# Patient Record
Sex: FEMALE | Race: WHITE | NOT HISPANIC OR LATINO
[De-identification: names, ages, dates, MRNs, and addresses within clinical notes are randomized per-mention and may not be internally consistent; named-entity substitution may affect disease eponyms.]

---

## 2017-05-20 ENCOUNTER — TRANSCRIPTION ENCOUNTER (OUTPATIENT)
Age: 62
End: 2017-05-20

## 2021-07-29 ENCOUNTER — TRANSCRIPTION ENCOUNTER (OUTPATIENT)
Age: 66
End: 2021-07-29

## 2021-07-30 ENCOUNTER — OUTPATIENT (OUTPATIENT)
Dept: OUTPATIENT SERVICES | Facility: HOSPITAL | Age: 66
LOS: 1 days | Discharge: HOME | End: 2021-07-30

## 2021-07-30 LAB — GLUCOSE BLDC GLUCOMTR-MCNC: 129 MG/DL — HIGH (ref 70–99)

## 2021-10-15 VITALS
HEART RATE: 69 BPM | WEIGHT: 127.21 LBS | RESPIRATION RATE: 16 BRPM | DIASTOLIC BLOOD PRESSURE: 81 MMHG | OXYGEN SATURATION: 96 % | HEIGHT: 66 IN | SYSTOLIC BLOOD PRESSURE: 132 MMHG | TEMPERATURE: 98 F

## 2021-10-15 NOTE — ASU PATIENT PROFILE, ADULT - NS TRANSFER DISPOSITION PATIENT BELONGINGS
Latex:  Patient denies allergy to latex.  Medications reviewed with patient.  Tobacco use verified.  Allergies verified.    CHIEF COMPLAINT  Right shoulder pain     REFERRING PHYSICIAN:  Jamie Clemente MD  PRIMARY CARE PROVIDER - Jamie Clemente MD     Seen by BEV Jauregui 10/14/16.  Seen last by Dr. Reeder in .       HISTORY OF PRESENT ILLNESS        Patient is Right  hand dominant.         Occupation:  [employer]       Current work status:  working regular duty  1.  When did the main problem begin?  Ongoing for > 7 years  2.  Describe the injury and/or pain or other complaints:  Denies injury  3.  Locate the area of the problem/pain:   anterior, posterior and lateral, reports numbness and tingling in R arm after sleeping on it   4.  Is there any other pertinent background information?   None  5.  Has the patient had any treatment yet?   medications including NSAID, acetaminophen for more than six weeks and PT,  Massage, accupuncture  Yoga and boot camp twice weekly, reports has constant R shoulder pain    Pt states she never really felt like she got her full strength back in R shoulder. She has been working with a  for a while. She cannot due certain exercises due to the weakness. Shoulder is not really painful, \"inceased soreness and twingy on that side after exercising\". She feels like her R shoulder blade is more pronounced and her shoulder is more forward than the L. She is taking Tylenol or Aleve prn for pain.    PAST MEDICAL HISTORY  Past Medical History:   Diagnosis Date   • Colon Polyp 2012    hyperplastic polyp   • Family history of malignant neoplasm of gastrointestinal tract     paternal aunt, paternal grandmother   • Special screening for malignant neoplasms, colon 2019    Normal   • Unspecified spontaneous  without mention of complication        PAST SURGICAL HISTORY  Past Surgical History:   Procedure Laterality Date   • Biopsy of breast, incisional   06, 11    Right Breast Biopsy lipoma with fibroglandular breast tissue   •  delivery+postpartum care         41 weeks inactive ohase arrest of 5cm   • Colonoscopy diagnostic  09    Dr. Naik, [place] +FH, due2012 (no abn)   • Colonoscopy diagnostic  2012    Colonoscopy, Dx   • Colonoscopy diagnostic  2019    Dr. Qiu WNL recall, 2024   • Each add tooth extraction       wisdom teeth removed   • Laparoscopy procedure unlisted      laparoscopy with a R oophorectomy for a R endometrioma   • Reduction of large breast      bilateral breast reduction       REVIEW OF SYSTEMS    Gastrointestinal:  none  Genitourinary:  none  Nervous:  none  Cardiac:  none  Respiratory:  none  Integumentary:  none  Allergies:  See allergy list  Hematologic:  none         given to Security

## 2021-10-17 ENCOUNTER — TRANSCRIPTION ENCOUNTER (OUTPATIENT)
Age: 66
End: 2021-10-17

## 2021-10-18 ENCOUNTER — TRANSCRIPTION ENCOUNTER (OUTPATIENT)
Age: 66
End: 2021-10-18

## 2021-10-18 ENCOUNTER — OUTPATIENT (OUTPATIENT)
Dept: OUTPATIENT SERVICES | Facility: HOSPITAL | Age: 66
LOS: 1 days | Discharge: HOME | End: 2021-10-18
Payer: MEDICARE

## 2021-10-18 VITALS
RESPIRATION RATE: 15 BRPM | DIASTOLIC BLOOD PRESSURE: 76 MMHG | HEART RATE: 75 BPM | OXYGEN SATURATION: 97 % | SYSTOLIC BLOOD PRESSURE: 141 MMHG

## 2021-10-18 LAB
GLUCOSE BLDC GLUCOMTR-MCNC: 122 MG/DL — HIGH (ref 70–99)
GLUCOSE BLDC GLUCOMTR-MCNC: 124 MG/DL — HIGH (ref 70–99)

## 2021-10-18 PROCEDURE — C1755: CPT

## 2021-10-18 PROCEDURE — 82962 GLUCOSE BLOOD TEST: CPT

## 2021-10-18 PROCEDURE — 62350 IMPLANT SPINAL CANAL CATH: CPT

## 2021-10-18 PROCEDURE — 76000 FLUOROSCOPY <1 HR PHYS/QHP: CPT

## 2021-10-18 RX ORDER — ATORVASTATIN CALCIUM 80 MG/1
1 TABLET, FILM COATED ORAL
Qty: 0 | Refills: 0 | DISCHARGE

## 2021-10-18 RX ORDER — OXYCODONE HYDROCHLORIDE 5 MG/1
1 TABLET ORAL
Qty: 0 | Refills: 0 | DISCHARGE

## 2021-10-18 RX ORDER — GABAPENTIN 400 MG/1
1 CAPSULE ORAL
Qty: 0 | Refills: 0 | DISCHARGE

## 2021-10-18 RX ORDER — METFORMIN HYDROCHLORIDE 850 MG/1
1 TABLET ORAL
Qty: 0 | Refills: 0 | DISCHARGE

## 2021-10-18 RX ORDER — OXYCODONE AND ACETAMINOPHEN 5; 325 MG/1; MG/1
2 TABLET ORAL ONCE
Refills: 0 | Status: DISCONTINUED | OUTPATIENT
Start: 2021-10-18 | End: 2021-10-18

## 2021-10-18 RX ORDER — IRBESARTAN 75 MG/1
1 TABLET ORAL
Qty: 0 | Refills: 0 | DISCHARGE

## 2021-10-18 RX ORDER — FERROUS SULFATE 325(65) MG
0 TABLET ORAL
Qty: 0 | Refills: 0 | DISCHARGE

## 2021-10-18 RX ORDER — GABAPENTIN 400 MG/1
800 CAPSULE ORAL THREE TIMES A DAY
Refills: 0 | Status: DISCONTINUED | OUTPATIENT
Start: 2021-10-18 | End: 2021-10-18

## 2021-10-18 RX ORDER — HYOSCYAMINE SULFATE 0.13 MG
0 TABLET ORAL
Qty: 0 | Refills: 0 | DISCHARGE

## 2021-10-18 RX ORDER — UBIDECARENONE 100 MG
100 CAPSULE ORAL
Qty: 0 | Refills: 0 | DISCHARGE

## 2021-10-18 RX ORDER — LAMOTRIGINE 25 MG/1
0 TABLET, ORALLY DISINTEGRATING ORAL
Qty: 0 | Refills: 0 | DISCHARGE

## 2021-10-18 RX ORDER — ZOLPIDEM TARTRATE 10 MG/1
1 TABLET ORAL
Qty: 0 | Refills: 0 | DISCHARGE

## 2021-10-18 RX ORDER — CHOLECALCIFEROL (VITAMIN D3) 125 MCG
1 CAPSULE ORAL
Qty: 0 | Refills: 0 | DISCHARGE

## 2021-10-18 RX ORDER — ESCITALOPRAM OXALATE 10 MG/1
1 TABLET, FILM COATED ORAL
Qty: 0 | Refills: 0 | DISCHARGE

## 2021-10-18 RX ADMIN — OXYCODONE AND ACETAMINOPHEN 2 TABLET(S): 5; 325 TABLET ORAL at 18:51

## 2021-10-18 NOTE — PACU DISCHARGE NOTE - COMMENTS
Patient met PACU criteria. Vital signs stable. Patient ambulated to bathroom and voided. Patient's pain controlled with percocet 2 tabs X 1 time. Epidural catheter site clean dry and intact. Patient expressed a good understanding of care of epidural box. Sling given to patient to contain the epidural medicine box. Heplock discontinued prior to discharge. Patient escorted to [facility] accompanied by nursing assistant. Patient's  escorting patient to home. Patient to see Dr. Nolan tomorrow, patient aware of same.

## 2021-10-18 NOTE — DISCHARGE NOTE PROVIDER - NSDCCPTREATMENT_GEN_ALL_CORE_FT
PRINCIPAL PROCEDURE  Procedure: Insertion, tunneled epidural catheter  Findings and Treatment: 40136

## 2021-10-18 NOTE — DISCHARGE NOTE PROVIDER - NSDCMRMEDTOKEN_GEN_ALL_CORE_FT
CoQ10: 100 milligram(s) orally once a day  gabapentin 800 mg oral tablet: 1 tab(s) orally 3 times a day  hyoscyamine 0.375 mg oral capsule, extended release: orally 2 times a day, As Needed  irbesartan 150 mg oral tablet: 1 tab(s) orally once a day  iron sulfate: orally every other day (at bedtime)  LaMICtal 100 mg oral tablet: orally once a day  Lexapro 20 mg oral tablet: 1 tab(s) orally once a day  Lipitor 10 mg oral tablet: 1 tab(s) orally once a day  metFORMIN 1000 mg oral tablet: 1 tab(s) orally 2 times a day  OxyCONTIN 20 mg oral tablet, extended release: 1 tab(s) orally every 12 hours  Percocet 10/325 oral tablet: 1 tab(s) orally every 6 hours  Vitamin D3 25 mcg (1000 intl units) oral tablet: 1 tab(s) orally once a day  zolpidem 12.5 mg oral tablet, extended release: 1 tab(s) orally once a day (at bedtime)

## 2021-10-18 NOTE — DISCHARGE NOTE PROVIDER - NSDCCPCAREPLAN_GEN_ALL_CORE_FT
PRINCIPAL DISCHARGE DIAGNOSIS  Diagnosis: Chronic pain disorder  Assessment and Plan of Treatment:

## 2021-10-18 NOTE — ASU DISCHARGE PLAN (ADULT/PEDIATRIC) - CALL YOUR DOCTOR IF YOU HAVE ANY OF THE FOLLOWING:
Bleeding that does not stop/Fever greater than (need to indicate Fahrenheit or Celsius)/Numbness, tingling, color or temperature change to extremity/Nausea and vomiting that does not stop/Unable to urinate

## 2021-11-11 ENCOUNTER — TRANSCRIPTION ENCOUNTER (OUTPATIENT)
Age: 66
End: 2021-11-11

## 2021-11-12 ENCOUNTER — OUTPATIENT (OUTPATIENT)
Dept: OUTPATIENT SERVICES | Facility: HOSPITAL | Age: 66
LOS: 1 days | Discharge: HOME | End: 2021-11-12

## 2021-11-12 LAB — GLUCOSE BLDC GLUCOMTR-MCNC: 129 MG/DL — HIGH (ref 70–99)

## 2023-03-07 PROBLEM — Z00.00 ENCOUNTER FOR PREVENTIVE HEALTH EXAMINATION: Status: ACTIVE | Noted: 2023-03-07

## 2023-03-15 ENCOUNTER — APPOINTMENT (OUTPATIENT)
Dept: ORTHOPEDIC SURGERY | Facility: CLINIC | Age: 68
End: 2023-03-15
Payer: MEDICARE

## 2023-03-15 VITALS — HEIGHT: 66 IN | BODY MASS INDEX: 20.25 KG/M2 | WEIGHT: 126 LBS

## 2023-03-15 DIAGNOSIS — G89.29 DORSALGIA, UNSPECIFIED: ICD-10-CM

## 2023-03-15 DIAGNOSIS — M41.80 OTHER FORMS OF SCOLIOSIS, SITE UNSPECIFIED: ICD-10-CM

## 2023-03-15 DIAGNOSIS — F17.200 NICOTINE DEPENDENCE, UNSPECIFIED, UNCOMPLICATED: ICD-10-CM

## 2023-03-15 DIAGNOSIS — M54.16 RADICULOPATHY, LUMBAR REGION: ICD-10-CM

## 2023-03-15 DIAGNOSIS — M54.9 DORSALGIA, UNSPECIFIED: ICD-10-CM

## 2023-03-15 PROCEDURE — 72110 X-RAY EXAM L-2 SPINE 4/>VWS: CPT

## 2023-03-15 PROCEDURE — 72070 X-RAY EXAM THORAC SPINE 2VWS: CPT

## 2023-03-15 PROCEDURE — 99204 OFFICE O/P NEW MOD 45 MIN: CPT

## 2023-03-18 PROBLEM — M54.9 CHRONIC BACK PAIN: Status: ACTIVE | Noted: 2023-03-18

## 2023-03-18 PROBLEM — M54.16 LUMBAR RADICULOPATHY: Status: ACTIVE | Noted: 2023-03-18

## 2023-03-18 PROBLEM — M41.80 DEGENERATIVE SCOLIOSIS IN ADULT PATIENT: Status: ACTIVE | Noted: 2023-03-18

## 2023-03-20 NOTE — ASSESSMENT
[FreeTextEntry1] : 67 year old female presents with acute exacerbation of chronic back pain. She has scoliosis. She has recent falls which has contributed to the pain.  She has pain radiating to her left leg.  She had a L3-L4 TLIF for back and leg pain by Dr Dong Johnson [facility] 2020.  Her pain did not improve.  She had a intrathecal pain pump and SCS placed. She takes oxycodone 15 mg q6h.  She is diabetic and her last Hgb A1C was 6.4.  She has an endocrinologist and has been treated for osteoporosis.  She uses tobacco and smokes 6 cigarettes a day.  She is otherwise at her neurologic baseline.We reviewed her imaging including recent CT scans.  She would require revision spinal fusion.  She must quit tobacco first for over 3 months.  She should also discuss with her endocrinologists further treatments for osteoporosis.  She can continue oxycodone and gabapentin.  She can continue PT. She can consider spinal injections. She will followup in 3 months. We discussed red flag symptoms that would require emergent evaluation. She knows to call with any questions or concerns or if her symptoms acutely worsen.

## 2023-03-20 NOTE — PHYSICAL EXAM
[de-identified] : General: No acute distress, conversant, well-nourished.\par Head: Normocephalic, atraumatic\par Neck: trachea midline, FROM\par Heart: normotensive and normal rate and rhythm\par Lungs: No labored breathing\par Skin: No abrasions, no rashes, no edema\par Psych: Alert and oriented to person, place and time\par Extremities: no peripheral edema or digital cyanosis\par Gait: Normal gait. Needs gait aid.\par Vascular: warm and well perfused distally, palpable distal pulses\par \par MSK:\par Spine: \par No tenderness to palpation.  No step-off, no deformity.\par Visible spinal deformity\par \par NEURO:\par Sensation \par          Left           \par C5     2/2               \par C6     2/2               \par C7     2/2               \par C8     2/2              \par T1     2/2             \par \par          Right         \par C5     2/2               \par C6     2/2               \par C7     2/2               \par C8     2/2              \par T1     2/2      \par \par Motor: \par                                                Left             \par C5 (deltoid abduction)             5/5               \par C6 (biceps flexion)                   5/5                \par C7 (triceps extension)             5/5               \par C8 (finger flexion)                     5/5               \par T1 (interosseous)                     5/5           \par \par                                                Right           \par C5 (deltoid abduction)             5/5               \par C6 (biceps flexion)                   5/5                \par C7 (triceps extension)             5/5               \par C8 (finger flexion)                     5/5               \par T1 (interosseous)                     5/5                     \par \par Sensation \par Left L2  -  2/2            \par Left L3  -  2/2\par Left L4  -  2/2\par Left L5  -  2/2\par Left S1  -  2/2\par \par Right L2  -  2/2            \par Right L3  -  2/2\par Right L4  -  2/2\par Right L5  -  2/2\par Right S1  -  2/2\par \par Motor: \par Left L2 (hip flexion)                            5/5                \par Left L3 (knee extension)                   5/5                \par Left L4 (ankle dorsiflexion)                 5/5                \par Left L5 (long toe extensor)                5/5                \par Left S1 (ankle plantar flexion)           5/5\par \par Right L2 (hip flexion)                            5/5                \par Right L3 (knee extension)                   5/5                \par Right L4 (ankle dorsiflexion)                 5/5                \par Right L5 (long toe extensor)                5/5                \par Right S1 (ankle plantar flexion)           5/5\par \par Reflexes: Normal and symmetric\par Negative Spurling’s test.  Negative Bowman’s reflex.  \par Negative clonus.  Down-going Babinski. [de-identified] : I ordered radiographs to evaluate the patient's symptoms.\par \par Thoracic 2 view and lumbar 4 view radiographs obtained in the office today shows no fracture or dislocation.  Severe thoracolumbar scoliosis. s/p L3-L4 TLIF with hardware in appropriate position and alignment.  No instability on dynamic series. \par \par CT Lumbar (2/17/23) Mild lumbar levoscoliosis is redemonstrated. There is no spondylolisthesis or loss of vertebral body heights. Instrumented anterior and posterior fusion at the L3 and L4 levels is redemonstrated. No hardware fracture or loosening is detected. No new solid osseous fusion is detected.\par \par Contrast material is concentrated within the thecal sac at the lower lumbar spine and sacrum, presumably related to intrathecal catheter injection.\par \par Again demonstrated are neurostimulator leads which enter the dorsal aspect of the central canal at the T12-L1 level. Neurostimulator leads extend cranially within the dorsal aspect of the central canal. This is stable in appearance when compared to the prior examination. No lead discontinuity is detected.\par \par Again demonstrated is a spinal catheter which enters the dorsal aspect of the central canal at the L4-L5 level, extending superiorly within the left thecal sac. This is essentially stable in appearance when compared to the prior examination. No catheter discontinuity is detected.\par \par L5-S1: A disc bulge and bilateral facet arthropathy are redemonstrated. Mild bilateral foraminal narrowing is stable. No central canal stenosis is observed.\par \par L4-L5: A disc bulge and bilateral facet arthropathy are redemonstrated. Right greater than left foraminal narrowing is stable. Mild central canal stenosis is stable.\par \par L3-L4: Evaluation of the central canal and neural foramina is limited due to streak artifact. There again appears to be a broad-based right posterolateral disc herniation present. No significant central canal narrowing or foraminal narrowing is detected.\par \par L2-L3: A disc bulge and posterior element hypertrophy are redemonstrated. Mild central canal stenosis is stable. No foraminal narrowing is observed. \par \par L1-L2: A mild disc bulge is present with no significant foraminal narrowing or central canal stenosis, stable. The visualized portions of the sacrum and sacroiliac joints are unremarkable. There appears to be marked distention of the urinary bladder, nonspecific.\par \par CT Thoracic (2/17/23): Thoracic dextroscoliosis is redemonstrated. There is no spondylolisthesis or loss of vertebral body heights. Discogenic degenerative endplate changes centered at T2-T3 are redemonstrated.\par \par Again demonstrated are neurostimulator leads which enter the dorsal aspect of the central canal at the T12-L1 level. Neurostimulator leads extend cranially within the dorsal aspect of the central canal to the superior T7 level. This is stable in appearance when compared to the prior examination. No lead discontinuity is detected.\par \par Again demonstrated is a spinal catheter extending superiorly within the left thecal sac to the mid T10 level. This is essentially stable in appearance when compared to the prior examination. No catheter discontinuity is detected.\par \par Streak artifact related to a neurostimulator leads limits regional evaluation. Otherwise, no significant thoracic disc herniation, central canal stenosis, or foraminal narrowing is detected.\par \par Multiple hypodense nodules involving the left thyroid lobe are redemonstrated. No mass effect upon the tracheal air column is observed.

## 2023-03-20 NOTE — HISTORY OF PRESENT ILLNESS
[de-identified] : 67 year old female presents with acute exacerbation of chronic back pain. She has scoliosis. She has recent falls which has contributed to the pain.  She has pain radiating to her left leg.  She had a L3-L4 TLIF for back and leg pain by Dr Dong Johnson [facility] 2020.  Her pain did not improve.  She had a intrathecal pain pump and SCS placed. She takes oxycodone 15 mg q6h.  She is diabetic and her last Hgb A1C was 6.4.  She has an endocrinologist and has been treated for osteoporosis.  She uses tobacco and smokes 6 cigarettes a day.  She denies recent illness, fevers, numbness, saddle anesthesia, urinary retention or fecal incontinence. She has been doing PT with some relief.  She also takes Gabapentin and Belbuca.

## 2023-03-20 NOTE — HISTORY OF PRESENT ILLNESS
[de-identified] : 67 year old female presents with acute exacerbation of chronic back pain. She has scoliosis. She has recent falls which has contributed to the pain.  She has pain radiating to her left leg.  She had a L3-L4 TLIF for back and leg pain by Dr Dong Johnson [facility] 2020.  Her pain did not improve.  She had a intrathecal pain pump and SCS placed. She takes oxycodone 15 mg q6h.  She is diabetic and her last Hgb A1C was 6.4.  She has an endocrinologist and has been treated for osteoporosis.  She uses tobacco and smokes 6 cigarettes a day.  She denies recent illness, fevers, numbness, saddle anesthesia, urinary retention or fecal incontinence. She has been doing PT with some relief.  She also takes Gabapentin and Belbuca.

## 2023-03-20 NOTE — PHYSICAL EXAM
[de-identified] : General: No acute distress, conversant, well-nourished.\par Head: Normocephalic, atraumatic\par Neck: trachea midline, FROM\par Heart: normotensive and normal rate and rhythm\par Lungs: No labored breathing\par Skin: No abrasions, no rashes, no edema\par Psych: Alert and oriented to person, place and time\par Extremities: no peripheral edema or digital cyanosis\par Gait: Normal gait. Needs gait aid.\par Vascular: warm and well perfused distally, palpable distal pulses\par \par MSK:\par Spine: \par No tenderness to palpation.  No step-off, no deformity.\par Visible spinal deformity\par \par NEURO:\par Sensation \par          Left           \par C5     2/2               \par C6     2/2               \par C7     2/2               \par C8     2/2              \par T1     2/2             \par \par          Right         \par C5     2/2               \par C6     2/2               \par C7     2/2               \par C8     2/2              \par T1     2/2      \par \par Motor: \par                                                Left             \par C5 (deltoid abduction)             5/5               \par C6 (biceps flexion)                   5/5                \par C7 (triceps extension)             5/5               \par C8 (finger flexion)                     5/5               \par T1 (interosseous)                     5/5           \par \par                                                Right           \par C5 (deltoid abduction)             5/5               \par C6 (biceps flexion)                   5/5                \par C7 (triceps extension)             5/5               \par C8 (finger flexion)                     5/5               \par T1 (interosseous)                     5/5                     \par \par Sensation \par Left L2  -  2/2            \par Left L3  -  2/2\par Left L4  -  2/2\par Left L5  -  2/2\par Left S1  -  2/2\par \par Right L2  -  2/2            \par Right L3  -  2/2\par Right L4  -  2/2\par Right L5  -  2/2\par Right S1  -  2/2\par \par Motor: \par Left L2 (hip flexion)                            5/5                \par Left L3 (knee extension)                   5/5                \par Left L4 (ankle dorsiflexion)                 5/5                \par Left L5 (long toe extensor)                5/5                \par Left S1 (ankle plantar flexion)           5/5\par \par Right L2 (hip flexion)                            5/5                \par Right L3 (knee extension)                   5/5                \par Right L4 (ankle dorsiflexion)                 5/5                \par Right L5 (long toe extensor)                5/5                \par Right S1 (ankle plantar flexion)           5/5\par \par Reflexes: Normal and symmetric\par Negative Spurling’s test.  Negative Bowman’s reflex.  \par Negative clonus.  Down-going Babinski. [de-identified] : I ordered radiographs to evaluate the patient's symptoms.\par \par Thoracic 2 view and lumbar 4 view radiographs obtained in the office today shows no fracture or dislocation.  Severe thoracolumbar scoliosis. s/p L3-L4 TLIF with hardware in appropriate position and alignment.  No instability on dynamic series. \par \par CT Lumbar (2/17/23) Mild lumbar levoscoliosis is redemonstrated. There is no spondylolisthesis or loss of vertebral body heights. Instrumented anterior and posterior fusion at the L3 and L4 levels is redemonstrated. No hardware fracture or loosening is detected. No new solid osseous fusion is detected.\par \par Contrast material is concentrated within the thecal sac at the lower lumbar spine and sacrum, presumably related to intrathecal catheter injection.\par \par Again demonstrated are neurostimulator leads which enter the dorsal aspect of the central canal at the T12-L1 level. Neurostimulator leads extend cranially within the dorsal aspect of the central canal. This is stable in appearance when compared to the prior examination. No lead discontinuity is detected.\par \par Again demonstrated is a spinal catheter which enters the dorsal aspect of the central canal at the L4-L5 level, extending superiorly within the left thecal sac. This is essentially stable in appearance when compared to the prior examination. No catheter discontinuity is detected.\par \par L5-S1: A disc bulge and bilateral facet arthropathy are redemonstrated. Mild bilateral foraminal narrowing is stable. No central canal stenosis is observed.\par \par L4-L5: A disc bulge and bilateral facet arthropathy are redemonstrated. Right greater than left foraminal narrowing is stable. Mild central canal stenosis is stable.\par \par L3-L4: Evaluation of the central canal and neural foramina is limited due to streak artifact. There again appears to be a broad-based right posterolateral disc herniation present. No significant central canal narrowing or foraminal narrowing is detected.\par \par L2-L3: A disc bulge and posterior element hypertrophy are redemonstrated. Mild central canal stenosis is stable. No foraminal narrowing is observed. \par \par L1-L2: A mild disc bulge is present with no significant foraminal narrowing or central canal stenosis, stable. The visualized portions of the sacrum and sacroiliac joints are unremarkable. There appears to be marked distention of the urinary bladder, nonspecific.\par \par CT Thoracic (2/17/23): Thoracic dextroscoliosis is redemonstrated. There is no spondylolisthesis or loss of vertebral body heights. Discogenic degenerative endplate changes centered at T2-T3 are redemonstrated.\par \par Again demonstrated are neurostimulator leads which enter the dorsal aspect of the central canal at the T12-L1 level. Neurostimulator leads extend cranially within the dorsal aspect of the central canal to the superior T7 level. This is stable in appearance when compared to the prior examination. No lead discontinuity is detected.\par \par Again demonstrated is a spinal catheter extending superiorly within the left thecal sac to the mid T10 level. This is essentially stable in appearance when compared to the prior examination. No catheter discontinuity is detected.\par \par Streak artifact related to a neurostimulator leads limits regional evaluation. Otherwise, no significant thoracic disc herniation, central canal stenosis, or foraminal narrowing is detected.\par \par Multiple hypodense nodules involving the left thyroid lobe are redemonstrated. No mass effect upon the tracheal air column is observed.

## 2023-03-20 NOTE — ASSESSMENT
[FreeTextEntry1] : 67 year old female presents with acute exacerbation of chronic back pain. She has scoliosis. She has recent falls which has contributed to the pain.  She has pain radiating to her left leg.  She had a L3-L4 TLIF for back and leg pain by Dr Dong Johnson [facility] 2020.  Her pain did not improve.  She had a intrathecal pain pump and SCS placed. She takes oxycodone 15 mg q6h.  She is diabetic and her last Hgb A1C was 6.4.  She has an endocrinologist and has been treated for osteoporosis.  She uses tobacco and smokes 6 cigarettes a day.  She is otherwise at her neurologic baseline.We reviewed her imaging including recent CT scans.  She would require revision spinal fusion.  She must quit tobacco first for over 3 months.  She should also discuss with her endocrinologists further treatments for osteoporosis.  She can continue oxycodone and gabapentin.  She can continue PT. She can consider spinal injections. She will followup in 3 months. We discussed red flag symptoms that would require emergent evaluation. She knows to call with any questions or concerns or if her symptoms acutely worsen.

## 2023-06-14 ENCOUNTER — APPOINTMENT (OUTPATIENT)
Dept: ORTHOPEDIC SURGERY | Facility: CLINIC | Age: 68
End: 2023-06-14

## 2023-09-07 NOTE — ASU PATIENT PROFILE, ADULT - PAIN CHRONIC, PROFILE
Please call the office at [phone] with any changes to your health including but not limited to: cardiac, diabetic or an addition of a blood thinner or weight loss medication for example.  This could affect your upcoming procedure(s).    We understand circumstances happen; if you need to reschedule your procedure, we would appreciate a 5-day notice if possible, so we have the opportunity to adjust the schedule. -Thank you      If diabetic monitor blood sugars throughout morning.   Insulin should be held the morning of the procedure  Do not take oral diabetic medications the morning of your procedure.         Colonoscopy Golytely Prep    No alcohol or illegal drugs 48 hours prior to the procedure. If you consume marijuana products or \"weed\" within 24 hours prior to your procedure, your procedure will be cancelled due to hospital policy.     Please  Golytely/Dulcolax at the [facility] pharmacy.  If the pharmacy does not have this in stock, please contact our office and we can send it to another pharmacy.      One Week Prior to the Procedure:  October 12    Stop Iron Supplements including multivitamins  Avoid eating popcorn, seeds and nuts  Stop Eliquis for 2 days prior to procedure    2-3 days prior to the Procedure    A member of our Pre-admission team will be contacting you 2-3 days prior to your procedure from a phone number listed as [phone]. This phone number may be marked as spa, depending on your cell phone carrier.     The Day Prior to the Procedure:  October 18    Breakfast: You can have two eggs and two slices of toast with butter/margarine OR two slices of toast with butter/margarine and a cup of cottage cheese.   Lunch: One cup of macaroni and cheese AND/OR 8oz vanilla yogurt.   Thereafter continue to consume full liquid diet which includes such things as white milk, plain vanilla ice cream, plain vanilla pudding, plain vanilla yogurt, Ensure/Boost UNTIL 6 PM.    May have clear  liquid diet as stated below all day UNTIL MIDNIGHT.   Such as: coffee, tea, broth (without noodles), Jell-O (without fruit), clear fruit juice (like white grape or apple), 7-Up, Popsicles and Amrik-Aid. Do not drink pulpy juices such as orange juice.   DO NOT EAT ANYTHING WITH FRUIT, VEGETABLES, NUTS OR SEEDS. AVOID ANYTHING RED OR PURPLE IN COLOR.  Take 2 Dulcolax tablets at 12:00 noon.  Drink 1/2 gallon of Golytely between 5:00 pm and 7:00 pm (you can mix with sugar-free Crystal Light or Gatorade, if desired. No red or purple colors).  Nothing to eat or drink after midnight except for the remaining Golytely  in the morning as directed.  Insulin dose should be half the usual dose the night prior to the procedure    THE MORNING OF THE PROCEDURE:  October 19    Drink the remaining half gallon of Golytely between 4:50am-6:50am  NOTHING more to eat or drink.  If unable to finish prep in the morning, or if there is formed stool present and not clear call Same Day Surgery for further instructions [phone]          If diabetic monitor blood sugars throughout morning.   Insulin should be held the morning of the procedure  Do not take oral diabetic medications the morning of your procedure.    All other prescribed medication should be taken as usual (these will not affect the prep)  Please arrange transportation back to your home following the procedure. No buses or cabs unless accompanied by a responsible adult.    Activity  Due to the medication you received during your Colonoscopy, Flexible Sigmoidoscopy or an Upper endoscopy (EGD) , do not drive a motor vehicle, operate machinery or appliances, make important decisions, sign legal documents, or drink alcohol for 24 hours.    [facility] - Same Day Surgery 2nd Floor  [address] [place] [address]    Date and Time: October 19th at 9:55am                                              Time is subject to change**    The patient  should arrive at the hospital at 8:55am            Reminder: please check with your Insurance Company to see if this procedure will be covered.    If you have any questions about the prep, please call our office at [phone].    Your procedure will be performed by Gastroenterologist: Quinton Garcia MD.     yes

## 2023-12-08 NOTE — ASU PATIENT PROFILE, ADULT - HEALTHCARE QUESTIONS, PROFILE
Detail Level: Detailed
Size Of Lesion: 1.2
X Size Of Lesion In Cm (Optional): 1
Anatomic Location From Referring Provider: “right face”
Name Of The Referring Provider For Procedure: Curtis Lim MD
Incorporate Mauc In Note: Yes
//

## 2024-02-01 ENCOUNTER — APPOINTMENT (OUTPATIENT)
Dept: PAIN MANAGEMENT | Facility: CLINIC | Age: 69
End: 2024-02-01

## 2024-08-08 ENCOUNTER — APPOINTMENT (OUTPATIENT)
Dept: PAIN MANAGEMENT | Facility: CLINIC | Age: 69
End: 2024-08-08

## 2024-08-08 PROCEDURE — 99205 OFFICE O/P NEW HI 60 MIN: CPT

## 2024-08-08 NOTE — PHYSICAL EXAM
[Normal] : Regular, no tachycardia [Normal muscle bulk without asymmetry] : normal muscle bulk without asymmetry [Cane] : ambulates with cane [Patellar] : patellar 2+ and symmetric bilaterally [] : Sensory: [Achilles] : Achilles 2+ and symmetric bilaterally

## 2024-08-18 NOTE — HISTORY OF PRESENT ILLNESS
[___ yrs] : [unfilled] year(s) ago [Back Pain] : back pain [Constant] : constant [8] : a minimum pain level of 8/10 [10] : a maximum pain level of 10/10 [Dull] : dull [Standing] : standing [Sitting] : sitting [Walking] : walking [Medications] : medications [FreeTextEntry1] : Patient is a 69F who presents as a new patient with chronic back pain that radiates to the lower extremities L>R and scoliosis. She had a L3-4 TLIF in 2020 with Dr. Johnson at [facility] for her back pain which she reports did not improve her pain at all. She had a [employer]tr[employer] SCS placed and an intrathecal pump placed by Dr. Dennison at [facility]. She reports that the SCS did not improve her pain at all and has since been turned off. Additionally, with the intrathecal pump she has tried both fentanyl and prialt with side effects of confusion per her , since then the intrathecal pump has been turned off. 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 She reports today that her pain starts in her low back and radiates to the left groin and left thigh to the top of her knee cap. She reports that she is unable to sleep 2/2 to this pain. A few months ago she had fall due to weakness of her left leg, resulting in three fractures in the elft lower extremity that have since healed c/b a MRSA infection while inpatient. She is currently on oxycodone 15mg po q6h, gabapentin, nycenta, balbuca, and baclofen for pain control. She presents today seeking help with pain control and getting off of oxycodone.

## 2024-08-18 NOTE — ASSESSMENT
[FreeTextEntry1] : Patient is a 69F who presents as a new patient with chronic back pain that radiates to the lower extremities L>R and scoliosis. She had a L3-4 TLIF in 2020 with Dr. Johnson at [facility] for her back pain which she reports did not improve her pain at all. She had a SCS placed and an intrathecal pump in placed by Dr. Dennison at [facility], both are now off. She saw Dr. Gallo 5/2023 who recommended revision of her L3-4 TLIF but she did not follow up. We discussed that she needs to obtain lumbar spine MRI, lumbar flexion and extension xrays, and an EMG in order to find out if there is a structural problem that needs to be addressed by orthopedic surgery. It is possible that if no surgery is indicated we could trial a different SCS. However, our first step should be new imaging. We also discussed holding off on PT at this time until spine imaging is obtained. Patient expressed understanding of this plan, all questions were answered.  Plan: - Given script for MRI lumbar spine - Given script for flexion and extension lumbar spine xray - Given information to schedule an EMG of lower extremities - Plan to follow up with our office after imaging and EMG obtained - Patient to follow up with Dr. Gallo (ortho) - Patient to see PCP and get DEXA scan

## 2024-08-18 NOTE — REVIEW OF SYSTEMS
[Back Pain] : back pain [Radiating Pain] : radiating pain [Weakness] : weakness [Negative] : Constitutional [Neck Pain] : no neck pain [Muscle Pain] : no muscle pain [FreeTextEntry9] : pain radiating to left groin and left lower extremity to the knee cap. weakness in left leg as well

## 2024-08-18 NOTE — REASON FOR VISIT
[Initial Visit] : an initial pain management visit [Spouse] : spouse [FreeTextEntry2] : low back pain